# Patient Record
Sex: FEMALE | Race: BLACK OR AFRICAN AMERICAN | NOT HISPANIC OR LATINO | Employment: OTHER | URBAN - METROPOLITAN AREA
[De-identification: names, ages, dates, MRNs, and addresses within clinical notes are randomized per-mention and may not be internally consistent; named-entity substitution may affect disease eponyms.]

---

## 2019-06-24 ENCOUNTER — OFFICE VISIT (OUTPATIENT)
Dept: SURGERY | Facility: CLINIC | Age: 79
End: 2019-06-24
Payer: MEDICARE

## 2019-06-24 VITALS
DIASTOLIC BLOOD PRESSURE: 69 MMHG | BODY MASS INDEX: 22.2 KG/M2 | HEART RATE: 55 BPM | SYSTOLIC BLOOD PRESSURE: 181 MMHG | WEIGHT: 130 LBS | HEIGHT: 64 IN

## 2019-06-24 DIAGNOSIS — E04.2 MULTINODULAR GOITER: Primary | ICD-10-CM

## 2019-06-24 PROCEDURE — 1101F PR PT FALLS ASSESS DOC 0-1 FALLS W/OUT INJ PAST YR: ICD-10-PCS | Mod: CPTII,S$GLB,, | Performed by: SURGERY

## 2019-06-24 PROCEDURE — 99204 PR OFFICE/OUTPT VISIT, NEW, LEVL IV, 45-59 MIN: ICD-10-PCS | Mod: S$GLB,,, | Performed by: SURGERY

## 2019-06-24 PROCEDURE — 3077F PR MOST RECENT SYSTOLIC BLOOD PRESSURE >= 140 MM HG: ICD-10-PCS | Mod: CPTII,S$GLB,, | Performed by: SURGERY

## 2019-06-24 PROCEDURE — 3078F DIAST BP <80 MM HG: CPT | Mod: CPTII,S$GLB,, | Performed by: SURGERY

## 2019-06-24 PROCEDURE — 1101F PT FALLS ASSESS-DOCD LE1/YR: CPT | Mod: CPTII,S$GLB,, | Performed by: SURGERY

## 2019-06-24 PROCEDURE — 3077F SYST BP >= 140 MM HG: CPT | Mod: CPTII,S$GLB,, | Performed by: SURGERY

## 2019-06-24 PROCEDURE — 99204 OFFICE O/P NEW MOD 45 MIN: CPT | Mod: S$GLB,,, | Performed by: SURGERY

## 2019-06-24 PROCEDURE — 3078F PR MOST RECENT DIASTOLIC BLOOD PRESSURE < 80 MM HG: ICD-10-PCS | Mod: CPTII,S$GLB,, | Performed by: SURGERY

## 2019-06-24 PROCEDURE — 99499 RISK ADDL DX/OHS AUDIT: ICD-10-PCS | Mod: S$GLB,,, | Performed by: SURGERY

## 2019-06-24 PROCEDURE — 99499 UNLISTED E&M SERVICE: CPT | Mod: S$GLB,,, | Performed by: SURGERY

## 2019-06-24 RX ORDER — SODIUM CHLORIDE 9 MG/ML
INJECTION, SOLUTION INTRAVENOUS CONTINUOUS
Status: CANCELLED | OUTPATIENT
Start: 2019-06-24

## 2019-06-24 RX ORDER — LIDOCAINE HYDROCHLORIDE 10 MG/ML
1 INJECTION, SOLUTION EPIDURAL; INFILTRATION; INTRACAUDAL; PERINEURAL ONCE
Status: SHIPPED | OUTPATIENT
Start: 2019-06-24

## 2019-06-24 RX ORDER — PANTOPRAZOLE SODIUM 40 MG/1
TABLET, DELAYED RELEASE ORAL
Refills: 1 | COMMUNITY
Start: 2019-04-04

## 2019-06-24 NOTE — PROGRESS NOTES
History & Physical    SUBJECTIVE:     History of Present Illness:  Patient is a 79 y.o. female presents with symptomatic multinodular goiter.     Chief Complaint   Patient presents with    Other     thyroid       Review of patient's allergies indicates:  No Known Allergies    Current Outpatient Medications   Medication Sig Dispense Refill    furosemide (LASIX) 40 MG tablet Take 1 tablet (40 mg total) by mouth 2 (two) times daily. 60 tablet 3    hydrALAZINE (APRESOLINE) 10 MG tablet Take 10 mg by mouth 2 (two) times daily.        lisinopril (PRINIVIL,ZESTRIL) 20 MG tablet TK 1 T PO  D  1    lisinopril (PRINIVIL,ZESTRIL) 40 MG tablet Take 40 mg by mouth once daily.        metoprolol tartrate (LOPRESSOR) 50 MG tablet TK 1 T PO BID  1    pantoprazole (PROTONIX) 40 MG tablet TK 1 T PO QD  1    PROAIR HFA 90 mcg/actuation inhaler INL 2 PFS QID PRF WHEEZING  2    promethazine (PHENERGAN) 25 MG tablet TK 1 T PO  TID PRN NV  3    simvastatin (ZOCOR) 40 MG tablet Take 40 mg by mouth every evening.        warfarin (COUMADIN) 5 MG tablet Take 5 mg by mouth once daily.         No current facility-administered medications for this visit.        Past Medical History:   Diagnosis Date    Atrial fibrillation     Cataract     Chronic obstructive pulmonary disease     Congestive heart failure     COPD (chronic obstructive pulmonary disease)     Hyperglycemia     Hyperlipidemia     Hypertension     Stroke     Uterine prolapse      Past Surgical History:   Procedure Laterality Date    cataracts       Family History   Problem Relation Age of Onset    Hypertension Mother     Diabetes Daughter     Cancer Neg Hx      Social History     Tobacco Use    Smoking status: Current Every Day Smoker    Smokeless tobacco: Never Used   Substance Use Topics    Alcohol use: No    Drug use: No        Review of Systems:  Review of Systems   Constitutional: Positive for unexpected weight change.   HENT: Positive for voice  "change.    Eyes: Negative.    Respiratory: Negative.    Cardiovascular: Negative.    Gastrointestinal: Negative.    Endocrine: Negative.    Musculoskeletal: Negative.    Skin: Negative.    Allergic/Immunologic: Negative.    Neurological: Negative.    Hematological: Negative.    Psychiatric/Behavioral: Negative.    All other systems reviewed and are negative.      OBJECTIVE:     Vital Signs (Most Recent)  Pulse: (!) 55 (06/24/19 1519)  BP: (!) 181/69 (06/24/19 1519)  5' 4" (1.626 m)  59 kg (130 lb)     Physical Exam:  Physical Exam   Constitutional: She is oriented to person, place, and time. She appears well-developed and well-nourished.   HENT:   Head: Normocephalic and atraumatic.   Right Ear: External ear normal.   Left Ear: External ear normal.   Nose: Nose normal.   Mouth/Throat: Oropharynx is clear and moist.   Eyes: Pupils are equal, round, and reactive to light. Conjunctivae and EOM are normal.   Neck: Normal range of motion. Neck supple. Thyroid mass and thyromegaly present.       Cardiovascular: Normal rate, regular rhythm, normal heart sounds and intact distal pulses.   Pulmonary/Chest: Effort normal and breath sounds normal.   Abdominal: Soft. Bowel sounds are normal.   Musculoskeletal: Normal range of motion.   Neurological: She is alert and oriented to person, place, and time. She has normal reflexes.   Skin: Skin is warm and dry.   Psychiatric: She has a normal mood and affect. Her behavior is normal. Thought content normal.   Vitals reviewed.      Laboratory  none    Diagnostic Results:  US: Reviewed  multiple nodules    ASSESSMENT/PLAN:     Symtomatic multinodular goiter    PLAN:Plan     To the OR for subtotal thyroidectomy with the risks explained and she agrees to proceed       "

## 2019-07-09 ENCOUNTER — TELEPHONE (OUTPATIENT)
Dept: SURGERY | Facility: CLINIC | Age: 79
End: 2019-07-09

## 2019-07-09 NOTE — TELEPHONE ENCOUNTER
Pt called to cancel surgery for 7/12/19 she is not cleared by Cardiology.  She has a stress test scheduled for 8/5/19 and see Dr. Whitley on 8/13/19.  She will call us back to r/s once she is cleared.  [facility] surgery and  notified.

## 2019-11-18 ENCOUNTER — NURSE TRIAGE (OUTPATIENT)
Dept: ADMINISTRATIVE | Facility: CLINIC | Age: 79
End: 2019-11-18

## 2019-11-18 NOTE — TELEPHONE ENCOUNTER
Reason for Disposition   Health Information question, no triage required and triager able to answer question    Protocols used: INFORMATION ONLY CALL-A-AH    Pt stated she is having car trouble and have to cancel her appointment for this afternoon. Pt stated she will call back when she's able to reschedule.